# Patient Record
(demographics unavailable — no encounter records)

---

## 2025-06-10 NOTE — ASSESSMENT
[FreeTextEntry1] : EXAM Left elbow with mild swelling, skin intact, no ecchymosis. +ttp along biceps tendon. Pain with light resisted supination. Bicep tendon fibers palpated with lateral hook. Full arc of elbow from 0 to 135 and 80/80 pronation/supination. Able to make fist, oppose thumb to small finger and abduct fingers. Sensation intact throughout. <2sec cap refill.   Left elbow radiographs with no fracture nor dislocation. (3-view)  ASSESSMENT/PLAN Left biceps partial vs complete rupture - review pathoanatomy with patient and discussed concern for right biceps tendon rupture in light of mechanism, clinical picture and exam. Will obtain a left elbow MRI without contrast and follow-up thereafter to discuss results and develop plan-of-care. NSAIDs prn. Minimize lifting.  F/u after MRI

## 2025-06-10 NOTE — HISTORY OF PRESENT ILLNESS
[de-identified] : 6/10/25: 34M, RHD presents with left elbow pain after lifting an object and hearing a pop on 6/7/2025. Patient continues to experience aching pain and weakness. Went to [facility] ER, had x-ray imaging done with no evidence of a fracture, followed up with [facility] the next day who ordered an ultrasound performed at [facility].

## 2025-06-19 NOTE — ASSESSMENT
[FreeTextEntry1] : EXAM Left elbow with mild swelling, skin intact, no ecchymosis. +ttp along biceps tendon. Pain with light resisted supination. Bicep tendon fibers palpated with lateral hook. Full arc of elbow from 0 to 135 and 80/80 pronation/supination. Able to make fist, oppose thumb to small finger and abduct fingers. Sensation intact throughout. <2sec cap refill.   Left elbow radiographs with no fracture nor dislocation. (3-view) Left elbow MRI with complete tear of distal biceps tendon, sheath appears intact.  ASSESSMENT/PLAN Left biceps complete rupture - review MRI, pathoanatomy with patient. Discussed operative vs nonoperative management, Patient will consult with Dr. Sosa for definitive management.  F/u with Dr. Sosa

## 2025-06-19 NOTE — HISTORY OF PRESENT ILLNESS
[de-identified] : 6/10/25: 34M, IRIS presents with left elbow pain after lifting an object and hearing a pop on 6/7/2025. Patient continues to experience aching pain and weakness. Went to [facility], had x-ray imaging done with no evidence of a fracture, followed up with [facility] the next day who ordered an ultrasound performed at [facility].  06/16/2025: Patient is following up on the left elbow, patient completed MRI of the elbow at  6/11/25. Patient reports pain has continued to worsen since last visit. Patent reports that he has taken Ibuprofen 800 twice, otherwise denies medication for pain.

## 2025-06-19 NOTE — HISTORY OF PRESENT ILLNESS
[de-identified] : 6/10/25: 34M, IRIS presents with left elbow pain after lifting an object and hearing a pop on 6/7/2025. Patient continues to experience aching pain and weakness. Went to [facility], had x-ray imaging done with no evidence of a fracture, followed up with [facility] the next day who ordered an ultrasound performed at [facility].  06/16/2025: Patient is following up on the left elbow, patient completed MRI of the elbow at  6/11/25. Patient reports pain has continued to worsen since last visit. Patent reports that he has taken Ibuprofen 800 twice, otherwise denies medication for pain.

## 2025-06-27 NOTE — ADDENDUM
[FreeTextEntry1] :   Documented by Adalgisa Luna acting as a scribe for Dr. Jeremy Sosa on 06/27/2025 and was presence for the following sections: Physical Exam; Data Reviewed; Assessment; Discussion/Summary. All medical record entries made by the Scribe were at my, Dr. Jeremy Sosa, direction and personally dictated by me on 06/27/2025. I have reviewed the chart and agree that the record accurately reflects my personal performance of the history, physical exam, procedure and imaging

## 2025-07-01 NOTE — HISTORY OF PRESENT ILLNESS
[de-identified] : The patient is s/p _LT DISTAL BICEP TENDON REPAIR Date of Surgery: 6/19/25 Pain:    At Rest: 8-9/10  With Activity:  6/10  Mechanism of injury: patient states he heard a pop when lifting and object, This is NOT a Work Related Injury being treated under Worker's Compensation. This is NOT an athletic injury occurring associated with an interscholastic or organized sports team. Treatment/Imaging/Studies Since Last Visit: Rest, ice, sling and Hydrocodone and tylenol  	Reports Available For Review Today: Surgery photos Out of work/sport: currently out of work School/Sport/Position/Occupation:  Change since last visit: patient reports weakness in fingers, significant pain Additional Information: s/p _LT DISTAL BICEP TENDON REPAIR Date of Surgery: 6/19/25

## 2025-07-01 NOTE — HISTORY OF PRESENT ILLNESS
[de-identified] : The patient is s/p _LT DISTAL BICEP TENDON REPAIR Date of Surgery: 6/19/25 Pain:    At Rest: 8-9/10  With Activity:  6/10  Mechanism of injury: patient states he heard a pop when lifting and object, This is NOT a Work Related Injury being treated under Worker's Compensation. This is NOT an athletic injury occurring associated with an interscholastic or organized sports team. Treatment/Imaging/Studies Since Last Visit: Rest, ice, sling and Hydrocodone and tylenol  	Reports Available For Review Today: Surgery photos Out of work/sport: currently out of work School/Sport/Position/Occupation:  Change since last visit: patient reports weakness in fingers, significant pain Additional Information: s/p _LT DISTAL BICEP TENDON REPAIR Date of Surgery: 6/19/25

## 2025-07-01 NOTE — PHYSICAL EXAM
[de-identified] : light touch and needle pin prick sensation intact in median, ulnar, and radial nerve distributions unable to extend IP joint of thumb and difficulty with finger extension 2+ radial pulse, hand warm and well perfused, brisk capillary refill all compartments soft and compressible, no wound drainage

## 2025-07-01 NOTE — HISTORY OF PRESENT ILLNESS
[de-identified] : The patient is s/p _LT DISTAL BICEP TENDON REPAIR Date of Surgery: 6/19/25 Pain:    At Rest: 8-9/10  With Activity:  6/10  Mechanism of injury: patient states he heard a pop when lifting and object, This is NOT a Work Related Injury being treated under Worker's Compensation. This is NOT an athletic injury occurring associated with an interscholastic or organized sports team. Treatment/Imaging/Studies Since Last Visit: Rest, ice, sling and Hydrocodone and tylenol  	Reports Available For Review Today: Surgery photos Out of work/sport: currently out of work School/Sport/Position/Occupation:  Change since last visit: patient reports weakness in fingers, significant pain Additional Information: s/p _LT DISTAL BICEP TENDON REPAIR Date of Surgery: 6/19/25

## 2025-07-01 NOTE — PHYSICAL EXAM
[de-identified] : light touch and needle pin prick sensation intact in median, ulnar, and radial nerve distributions unable to extend IP joint of thumb and difficulty with finger extension 2+ radial pulse, hand warm and well perfused, brisk capillary refill all compartments soft and compressible, no wound drainage

## 2025-07-01 NOTE — DISCUSSION/SUMMARY
[de-identified] : Patient presents with s/p distal biceps tendon repair with pain and finger/ hand weakness STAT MRI ordered will call when results are available.    Eliseo Patient contacted 6/28/25 after obtaining his MRIs to discuss results EXAM: MRI-LEFT FOREARM NON CONTRAST Postoperative changes consistent with recent reattachment of the distal biceps tendon to the proximal radius. No evidence for high-grade retear. Diffuse soft tissue edema. MRI-LEFT HUMERUS NON CONTRAST  IMPRESSION: Status post recent biceps tenodesis. No evidence for high-grade retear. Soft tissue edema along the anterior aspect of the distal arm consistent with history of recent surgery. No evidence for discrete fluid collection/hematoma.  We discussed on the phone performing a surgical wound exploration to evaluate his neuropathy  the patient agreed to move forward and demonstrated understanding of the risks benefits and alternatives

## 2025-07-01 NOTE — PHYSICAL EXAM
[de-identified] : light touch and needle pin prick sensation intact in median, ulnar, and radial nerve distributions unable to extend IP joint of thumb and difficulty with finger extension 2+ radial pulse, hand warm and well perfused, brisk capillary refill all compartments soft and compressible, no wound drainage

## 2025-07-01 NOTE — DISCUSSION/SUMMARY
[de-identified] : Patient presents with s/p distal biceps tendon repair with pain and finger/ hand weakness STAT MRI ordered will call when results are available.    Eliseo Patient contacted 6/28/25 after obtaining his MRIs to discuss results EXAM: MRI-LEFT FOREARM NON CONTRAST Postoperative changes consistent with recent reattachment of the distal biceps tendon to the proximal radius. No evidence for high-grade retear. Diffuse soft tissue edema. MRI-LEFT HUMERUS NON CONTRAST  IMPRESSION: Status post recent biceps tenodesis. No evidence for high-grade retear. Soft tissue edema along the anterior aspect of the distal arm consistent with history of recent surgery. No evidence for discrete fluid collection/hematoma.  We discussed on the phone performing a surgical wound exploration to evaluate his neuropathy  the patient agreed to move forward and demonstrated understanding of the risks benefits and alternatives

## 2025-07-01 NOTE — DISCUSSION/SUMMARY
[de-identified] : Patient presents with s/p distal biceps tendon repair with pain and finger/ hand weakness STAT MRI ordered will call when results are available.    Eliseo Patient contacted 6/28/25 after obtaining his MRIs to discuss results EXAM: MRI-LEFT FOREARM NON CONTRAST Postoperative changes consistent with recent reattachment of the distal biceps tendon to the proximal radius. No evidence for high-grade retear. Diffuse soft tissue edema. MRI-LEFT HUMERUS NON CONTRAST  IMPRESSION: Status post recent biceps tenodesis. No evidence for high-grade retear. Soft tissue edema along the anterior aspect of the distal arm consistent with history of recent surgery. No evidence for discrete fluid collection/hematoma.  We discussed on the phone performing a surgical wound exploration to evaluate his neuropathy  the patient agreed to move forward and demonstrated understanding of the risks benefits and alternatives

## 2025-07-02 NOTE — ADDENDUM
[FreeTextEntry1] : Documented by Javy Joseph acting as a scribe for Dr. Sosa and Oscar Collins PA-C on 07/02/2025 and was presence for the following sections: Physical Exam; Data Reviewed; Assessment; Discussion/Summary. All medical record entries made by the Scribe were at my, Dr. Sosa, and Oscar Collins, direction and personally dictated by me on 07/02/2025. I have reviewed the chart and agree that the record accurately reflects my personal performance of the history, physical exam, procedure and imaging.

## 2025-07-11 NOTE — REVIEW OF SYSTEMS
[Joint Pain] : joint pain [Negative] : Integumentary [FreeTextEntry9] : pain LUE [de-identified] : finger/hand weakness

## 2025-07-11 NOTE — PHYSICAL EXAM
[de-identified] : left upper extremity incisions clean dry and intact compartments soft and compressible hand warm and well perfused, BCR/2+ DP pulses sensation to light touch intact to median, ulnar, and radial nerve distributions posterior interosseous nerve motor deficit noted motor exam slightly limited due to positioning and pain. motor function of median, ulnar, and radial nerves appears to be intact

## 2025-07-11 NOTE — REVIEW OF SYSTEMS
[Joint Pain] : joint pain [Negative] : Integumentary [FreeTextEntry9] : pain LUE [de-identified] : finger/hand weakness

## 2025-07-11 NOTE — DATA REVIEWED
[FreeTextEntry1] : Becka Preseri MRI Left Humerus and Left Forearm 6/28/25  MRI-LEFT FOREARM NON CONTRAST Postoperative changes consistent with recent reattachment of the distal biceps tendon to the proximal radius. No evidence for high-grade retear. Diffuse soft tissue edema. MRI-LEFT HUMERUS NON CONTRAST  IMPRESSION: Status post recent biceps tenodesis. No evidence for high-grade retear. Soft tissue edema along the anterior aspect of the distal arm consistent with history of recent surgery. No evidence for discrete fluid collection/hematoma.

## 2025-07-11 NOTE — REVIEW OF SYSTEMS
[Joint Pain] : joint pain [Negative] : Integumentary [FreeTextEntry9] : pain LUE [de-identified] : finger/hand weakness

## 2025-07-11 NOTE — HISTORY OF PRESENT ILLNESS
[de-identified] : The patient is s/p  LT DISTAL BICEP TENDON REPAIR 6/19/25 and POSTOP WOUND EXPLORATION 6/29/25  Pain:    At Rest: 6/10  With Activity:  8/10  Mechanism of injury: patient states he heard a pop when lifting and object, This is NOT a Work Related Injury being treated under Worker's Compensation. This is NOT an athletic injury occurring associated with an interscholastic or organized sports team. Out of work/sport: currently out of work School/Sport/Position/Occupation:  Patient is here for follow up following POSTOP WOUND EXPLORATION 6/29/25

## 2025-07-11 NOTE — HISTORY OF PRESENT ILLNESS
[de-identified] : The patient is s/p  LT DISTAL BICEP TENDON REPAIR 6/19/25 and POSTOP WOUND EXPLORATION 6/29/25  Pain:    At Rest: 6/10  With Activity:  8/10  Mechanism of injury: patient states he heard a pop when lifting and object, This is NOT a Work Related Injury being treated under Worker's Compensation. This is NOT an athletic injury occurring associated with an interscholastic or organized sports team. Out of work/sport: currently out of work School/Sport/Position/Occupation:  Patient is here for follow up following POSTOP WOUND EXPLORATION 6/29/25

## 2025-07-11 NOTE — PHYSICAL EXAM
[de-identified] : left upper extremity incisions clean dry and intact compartments soft and compressible hand warm and well perfused, BCR/2+ DP pulses sensation to light touch intact to median, ulnar, and radial nerve distributions posterior interosseous nerve motor deficit noted motor exam slightly limited due to positioning and pain. motor function of median, ulnar, and radial nerves appears to be intact

## 2025-07-11 NOTE — PHYSICAL EXAM
[de-identified] : left upper extremity incisions clean dry and intact compartments soft and compressible hand warm and well perfused, BCR/2+ DP pulses sensation to light touch intact to median, ulnar, and radial nerve distributions posterior interosseous nerve motor deficit noted motor exam slightly limited due to positioning and pain. motor function of median, ulnar, and radial nerves appears to be intact

## 2025-07-11 NOTE — DISCUSSION/SUMMARY
[de-identified] : A wrist/hand/finger splint was molded to maintain extension A prescription was provided for a wrist/hand/finger orthotic with instructions on how to obtain it A prescription for hand therapy was provided We will obtain a doppler of the left upper extremity venous and arterial to rule out DVT or occlusion for sake of being complete however his exam does not appear to be concerning for either of these findings We discussed that his PIN nerve function is currently out and that we will obtain an EMG of the left upper extremity to further investigate the function and status of his nerves, after which he will follow up with hand/plastic surgery for evaluation with the EMG results.

## 2025-07-11 NOTE — DISCUSSION/SUMMARY
[de-identified] : A wrist/hand/finger splint was molded to maintain extension A prescription was provided for a wrist/hand/finger orthotic with instructions on how to obtain it A prescription for hand therapy was provided We will obtain a doppler of the left upper extremity venous and arterial to rule out DVT or occlusion for sake of being complete however his exam does not appear to be concerning for either of these findings We discussed that his PIN nerve function is currently out and that we will obtain an EMG of the left upper extremity to further investigate the function and status of his nerves, after which he will follow up with hand/plastic surgery for evaluation with the EMG results.

## 2025-07-11 NOTE — HISTORY OF PRESENT ILLNESS
[de-identified] : The patient is s/p  LT DISTAL BICEP TENDON REPAIR 6/19/25 and POSTOP WOUND EXPLORATION 6/29/25  Pain:    At Rest: 6/10  With Activity:  8/10  Mechanism of injury: patient states he heard a pop when lifting and object, This is NOT a Work Related Injury being treated under Worker's Compensation. This is NOT an athletic injury occurring associated with an interscholastic or organized sports team. Out of work/sport: currently out of work School/Sport/Position/Occupation:  Patient is here for follow up following POSTOP WOUND EXPLORATION 6/29/25

## 2025-07-11 NOTE — DISCUSSION/SUMMARY
[de-identified] : A wrist/hand/finger splint was molded to maintain extension A prescription was provided for a wrist/hand/finger orthotic with instructions on how to obtain it A prescription for hand therapy was provided We will obtain a doppler of the left upper extremity venous and arterial to rule out DVT or occlusion for sake of being complete however his exam does not appear to be concerning for either of these findings We discussed that his PIN nerve function is currently out and that we will obtain an EMG of the left upper extremity to further investigate the function and status of his nerves, after which he will follow up with hand/plastic surgery for evaluation with the EMG results.

## 2025-07-11 NOTE — PHYSICAL EXAM
[de-identified] : left upper extremity incisions clean dry and intact compartments soft and compressible hand warm and well perfused, BCR/2+ DP pulses sensation to light touch intact to median, ulnar, and radial nerve distributions posterior interosseous nerve motor deficit noted motor exam slightly limited due to positioning and pain. motor function of median, ulnar, and radial nerves appears to be intact

## 2025-07-11 NOTE — DISCUSSION/SUMMARY
[de-identified] : A wrist/hand/finger splint was molded to maintain extension A prescription was provided for a wrist/hand/finger orthotic with instructions on how to obtain it A prescription for hand therapy was provided We will obtain a doppler of the left upper extremity venous and arterial to rule out DVT or occlusion for sake of being complete however his exam does not appear to be concerning for either of these findings We discussed that his PIN nerve function is currently out and that we will obtain an EMG of the left upper extremity to further investigate the function and status of his nerves, after which he will follow up with hand/plastic surgery for evaluation with the EMG results.

## 2025-07-11 NOTE — REVIEW OF SYSTEMS
[Joint Pain] : joint pain [Negative] : Integumentary [FreeTextEntry9] : pain LUE [de-identified] : finger/hand weakness

## 2025-07-11 NOTE — HISTORY OF PRESENT ILLNESS
[de-identified] : The patient is s/p  LT DISTAL BICEP TENDON REPAIR 6/19/25 and POSTOP WOUND EXPLORATION 6/29/25  Pain:    At Rest: 6/10  With Activity:  8/10  Mechanism of injury: patient states he heard a pop when lifting and object, This is NOT a Work Related Injury being treated under Worker's Compensation. This is NOT an athletic injury occurring associated with an interscholastic or organized sports team. Out of work/sport: currently out of work School/Sport/Position/Occupation:  Patient is here for follow up following POSTOP WOUND EXPLORATION 6/29/25

## 2025-07-12 NOTE — PHYSICAL EXAM
[de-identified] : left upper extremity incisions clean dry and intact compartments soft and compressible hand warm and well perfused, BCR/2+ DP pulses sensation to light touch intact to median, ulnar, and radial nerve distributions posterior interosseous nerve motor deficit noted motor function of median, ulnar, and radial nerves appears to be intact

## 2025-07-12 NOTE — DISCUSSION/SUMMARY
[de-identified] : A wrist/hand/finger splint was molded to maintain extension A prescription was provided for a wrist/hand/finger orthotic with instructions on how to obtain it A prescription for hand therapy was provided Becka BEGUM venous doppler WNL, arterial noted potential flow abnormality however repeated at [facility] ED per patient was normal. We discussed that his PIN nerve function is currently out and that we will obtain an EMG of the left upper extremity to further investigate the function and status of his nerves, after which he will follow up with hand/plastic surgery for evaluation with the EMG results. Case discussed with hand/plastics post surgery who provided recs for EMG timing post surgery. Has EMG 7/15 and FU scheduled with hand/plastics post EMG

## 2025-07-12 NOTE — PHYSICAL EXAM
[de-identified] : left upper extremity incisions clean dry and intact compartments soft and compressible hand warm and well perfused, BCR/2+ DP pulses sensation to light touch intact to median, ulnar, and radial nerve distributions posterior interosseous nerve motor deficit noted motor function of median, ulnar, and radial nerves appears to be intact

## 2025-07-12 NOTE — HISTORY OF PRESENT ILLNESS
[de-identified] : The patient is s/p  LT DISTAL BICEP TENDON REPAIR 6/19/25 and POSTOP WOUND EXPLORATION 6/29/25 Date of surgery: 6/19/25 and 6/29/25 Pain:    At Rest: 6/10  With Activity:  8/10  Mechanism of injury: patient states he heard a pop when lifting and object, This is NOT a Work Related Injury being treated under Worker's Compensation. This is NOT an athletic injury occurring associated with an interscholastic or organized sports team. Treatment since last visit: PT and custom hand splint Out of work/sport: currently out of work School/Sport/Position/Occupation:  Changes since last vivist; Ptient denies fever chills and naseua post operatively. Still c/o weakness of LT hand/fingers Additional info:  s/p  LT DISTAL BICEP TENDON REPAIR 6/19/25 and POSTOP WOUND EXPLORATION 6/29/25

## 2025-07-12 NOTE — DISCUSSION/SUMMARY
[de-identified] : A wrist/hand/finger splint was molded to maintain extension A prescription was provided for a wrist/hand/finger orthotic with instructions on how to obtain it A prescription for hand therapy was provided Becka BEGUM venous doppler WNL, arterial noted potential flow abnormality however repeated at [facility] ED per patient was normal. We discussed that his PIN nerve function is currently out and that we will obtain an EMG of the left upper extremity to further investigate the function and status of his nerves, after which he will follow up with hand/plastic surgery for evaluation with the EMG results. Case discussed with hand/plastics post surgery who provided recs for EMG timing post surgery. Has EMG 7/15 and FU scheduled with hand/plastics post EMG

## 2025-07-29 NOTE — DISCUSSION/SUMMARY
[de-identified] : patient has scheduled surgery with Dr. Downing this wednesday, will fu with postop care with Dr. Downing Patient will continue physical therapy. cont hand splinting Will reach out to patient via phone post surgery this week    ----------------------------------------------- Home Exercise The patient is instructed on a home exercise program.  JAQUELIN CHAVEZ Acting as a Scribe for Dr. Ian MASCORRO, Jaquelin Chavez, attest that this documentation has been prepared under the direction and in the presence of Provider Jeremy Sosa MD.  Activity Modification The patient was advised to modify their activities.  Dx / Natural History The patient was advised of the diagnosis.  The natural history of the pathology was explained in full to the patient in layman's terms.  Several different treatment options were discussed and explained in full to the patient including the risks and benefits of both surgical and non-surgical treatments.  All questions and concerns were answered.  Pain Guide Activities The patient was advised to let pain guide the gradual advancement of activities.  NBA MASCORRO explained to the patient that rest, ice, compression, and elevation would benefit them.  They may return to activity after follow-up or when they no longer have any pain.  The patient's current medication management of their orthopedic diagnosis was reviewed today: (1) We discussed a comprehensive treatment plan that included possible pharmaceutical management involving the use of prescription strength medications including but not limited to options such as oral Naprosyn 500mg BID, once daily Meloxicam 15 mg, or 500-650 mg Tylenol versus over the counter oral medications and topical prescription NSAID Pennsaid vs over the counter Voltaren gel. (2) There is a moderate risk of morbidity with further treatment, especially from use of prescription strength medications and possible side effects of these medications which include upset stomach with oral medications, skin reactions to topical medications and cardiac/renal issues with long term use. (3) I recommended that the patient follow-up with their medical physician to discuss any significant specific potential issues with long term medication use such as interactions with current medications or with exacerbation of underlying medical comorbidities. (4) The benefits and risks associated with use of injectable, oral or topical, prescription and over the counter anti-inflammatory medications were discussed with the patient. The patient voiced understanding of the risks including but not limited to bleeding, stroke, kidney dysfunction, heart disease, and were referred to the black box warning label for further information.

## 2025-07-29 NOTE — HISTORY OF PRESENT ILLNESS
[de-identified] : The patient is s/p  LT DISTAL BICEP TENDON REPAIR 6/19/25 and POSTOP WOUND EXPLORATION 6/29/25 Date of surgery: 6/19/25 and 6/29/25 Pain:    At Rest: 4-5/10  With Activity:  4-5/10  Mechanism of injury: patient states he heard a pop when lifting and object, This is NOT a Work Related Injury being treated under Worker's Compensation. This is NOT an athletic injury occurring associated with an interscholastic or organized sports team. Treatment since last visit: OT @ [facility]- working on ROM Out of work/sport: currently out of work School/Sport/Position/Occupation:  Changes since last visit: patient reports improvement of elbow pain and symptoms since last visit. Progressing well in OT. Reports he is having nerve repair surgery Wednesday 7/30/25 with Dr Downing Additional info:  s/p  LT DISTAL BICEP TENDON REPAIR 6/19/25 and POSTOP WOUND EXPLORATION 6/29/25

## 2025-07-29 NOTE — DISCUSSION/SUMMARY
[de-identified] : patient has scheduled surgery with Dr. Downing this wednesday, will fu with postop care with Dr. Downing Patient will continue physical therapy. cont hand splinting Will reach out to patient via phone post surgery this week    ----------------------------------------------- Home Exercise The patient is instructed on a home exercise program.  JAQUELIN CHAVEZ Acting as a Scribe for Dr. Ian MASCORRO, Jaquelin Chavez, attest that this documentation has been prepared under the direction and in the presence of Provider Jeremy Sosa MD.  Activity Modification The patient was advised to modify their activities.  Dx / Natural History The patient was advised of the diagnosis.  The natural history of the pathology was explained in full to the patient in layman's terms.  Several different treatment options were discussed and explained in full to the patient including the risks and benefits of both surgical and non-surgical treatments.  All questions and concerns were answered.  Pain Guide Activities The patient was advised to let pain guide the gradual advancement of activities.  NBA MASCORRO explained to the patient that rest, ice, compression, and elevation would benefit them.  They may return to activity after follow-up or when they no longer have any pain.  The patient's current medication management of their orthopedic diagnosis was reviewed today: (1) We discussed a comprehensive treatment plan that included possible pharmaceutical management involving the use of prescription strength medications including but not limited to options such as oral Naprosyn 500mg BID, once daily Meloxicam 15 mg, or 500-650 mg Tylenol versus over the counter oral medications and topical prescription NSAID Pennsaid vs over the counter Voltaren gel. (2) There is a moderate risk of morbidity with further treatment, especially from use of prescription strength medications and possible side effects of these medications which include upset stomach with oral medications, skin reactions to topical medications and cardiac/renal issues with long term use. (3) I recommended that the patient follow-up with their medical physician to discuss any significant specific potential issues with long term medication use such as interactions with current medications or with exacerbation of underlying medical comorbidities. (4) The benefits and risks associated with use of injectable, oral or topical, prescription and over the counter anti-inflammatory medications were discussed with the patient. The patient voiced understanding of the risks including but not limited to bleeding, stroke, kidney dysfunction, heart disease, and were referred to the black box warning label for further information.

## 2025-07-29 NOTE — PHYSICAL EXAM
[de-identified] : left upper extremity incisions clean dry and intact compartments soft and compressible hand warm and well perfused, BCR/2+ DP pulses sensation to light touch intact to median, ulnar, and radial nerve distributions posterior interosseous nerve motor deficit noted motor function of median, ulnar, and radial nerves appears to be intact

## 2025-07-29 NOTE — HISTORY OF PRESENT ILLNESS
[de-identified] : The patient is s/p  LT DISTAL BICEP TENDON REPAIR 6/19/25 and POSTOP WOUND EXPLORATION 6/29/25 Date of surgery: 6/19/25 and 6/29/25 Pain:    At Rest: 4-5/10  With Activity:  4-5/10  Mechanism of injury: patient states he heard a pop when lifting and object, This is NOT a Work Related Injury being treated under Worker's Compensation. This is NOT an athletic injury occurring associated with an interscholastic or organized sports team. Treatment since last visit: OT @ [facility]- working on ROM Out of work/sport: currently out of work School/Sport/Position/Occupation:  Changes since last visit: patient reports improvement of elbow pain and symptoms since last visit. Progressing well in OT. Reports he is having nerve repair surgery Wednesday 7/30/25 with Dr Downing Additional info:  s/p  LT DISTAL BICEP TENDON REPAIR 6/19/25 and POSTOP WOUND EXPLORATION 6/29/25

## 2025-07-29 NOTE — PHYSICAL EXAM
[de-identified] : left upper extremity incisions clean dry and intact compartments soft and compressible hand warm and well perfused, BCR/2+ DP pulses sensation to light touch intact to median, ulnar, and radial nerve distributions posterior interosseous nerve motor deficit noted motor function of median, ulnar, and radial nerves appears to be intact